# Patient Record
Sex: FEMALE | Race: WHITE
[De-identification: names, ages, dates, MRNs, and addresses within clinical notes are randomized per-mention and may not be internally consistent; named-entity substitution may affect disease eponyms.]

---

## 2017-03-24 ENCOUNTER — HOSPITAL ENCOUNTER (EMERGENCY)
Dept: HOSPITAL 68 - ERH | Age: 37
End: 2017-03-24
Payer: COMMERCIAL

## 2017-03-24 VITALS — HEIGHT: 67 IN | WEIGHT: 140 LBS | BODY MASS INDEX: 21.97 KG/M2

## 2017-03-24 VITALS — DIASTOLIC BLOOD PRESSURE: 76 MMHG | SYSTOLIC BLOOD PRESSURE: 119 MMHG

## 2017-03-24 DIAGNOSIS — B34.9: Primary | ICD-10-CM

## 2017-03-24 NOTE — ED INFLUENZA/URI COMPLAINT
History of Present Illness
 
General
Chief Complaint: General Adult
Stated Complaint: FEVER/ACHY
Source: patient, family, old records
Exam Limitations: no limitations
 
Vital Signs & Intake/Output
Vital Signs & Intake/Output
 Vital Signs
 
 
Date Time Temp Pulse Resp B/P Pulse O2 O2 Flow FiO2
 
     Ox Delivery Rate 
 
03/24 1841 98.7 92 18 119/76 98 Room Air  
 
03/24 1756 100.7       
 
03/24 1756 100.7       
 
03/24 1713 100.8       
 
03/24 1653 100.8 120 18 119/83 98 Room Air  
 
 
Allergies
Coded Allergies:
nitrofurantoin (From MACROBID) (NAUSEA 06/03/16)
prednisone (CHEST HURT 03/24/17)
sulfamethoxazole (From BACTRIM) (RASH, HIVES 03/24/17)
trimethoprim (From BACTRIM) (RASH, HIVES 03/24/17)
 
Reconcile Medications
Ferrous Sulfate (Slow Release Iron) (Unknown Strength) TABLET.ER   (Unknown Dose
) PO SI ANEMIA  (Reported)
Levothyroxine Sodium (Synthroid) 50 MCG TABLET   1 TAB PO DAILY THYROID  (
Reported)
Oseltamivir Phosphate (Tamiflu) 75 MG CAPSULE   1 CAP PO BID viral
 
Triage Note:
RECEIVED 35 YO FEMALE C/O CHILLS, FEVER
INTERMITTENTLY, INTERMITTENT MID LOWER BACK PAIN,
ACHY LEGS. DRY NON PRODUCTIVE COUGH. NO C/O N/V/D.
NO C/O ABDOMINAL PAIN OR SOB/CP.
Triage Nurses Notes Reviewed? yes
Onset: Abrupt
Duration: day(s): (1), constant
Timing: recent history
Severity: mild, moderate
Severity Numbers: 5
Prior Episodes/Possible Cause: occassional episodes
No Modifying Factors: none
Associated Symptoms: fever/chills, muscle aches, nasal congestion
Pregnant: No
Patient currently breastfeeds: No
HPI:
36-year-old female with history of hypothyroid presents complaining of a one-day
history of chills subjective fevers generalized bodyaches lower back pain and 
nonproductive cough all since this morning.  The patient states that [facility] sick contacts at home similar symptoms.  She denies 
shortness of breath wheezing no history of asthma no chest pain abdominal pain 
nausea vomiting or diarrhea.  The patient denies any urinary symptoms rashes to 
her skin.  She is an active smoker 6 cigarettes a day.  She is not taken 
anything for her symptoms today.  There are no modifying factors or associated 
symptoms otherwise.
 
 
(MARIMAR SANDOVAL)
 
Past History
 
Travel History
Traveled to [place] past 21 day No
 
Medical History
Any Pertinent Medical History? see below for history
Neurological: NONE
EENT: NONE
Cardiovascular: HOSPITALIZED FOR POST PARTUM PREECLAMPSIA
Respiratory: NONE
Gastrointestinal: NONE
Hepatic: NONE
Renal: nephrolithiasis
Musculoskeletal: NONE
Psychiatric: NONE
Endocrine: hypothyroidism
Blood Disorders: NONE
Cancer(s): NONE
GYN/Reproductive: NONE
 
Surgical History
Surgical History: non-contributory
 
Psychosocial History
What is your primary language English
Tobacco Use: Current Daily Use
Daily Tobacco Use Amount/Type: => 5 Cigarettes daily
 
Family History
Hx Contributory? No
(MARIMAR SANDOVAL)
 
Review of Systems
 
Review of Systems
Constitutional:
Reports: see HPI. 
All Other Systems: Reviewed and Negative
Comments
Review of systems: See HPI, All other systems negative.
Constitutional, no chills  fever, no malaise 
HEENT: No visual changes no sore throat ncongestion
Cardiovascular: No chest pain , no palpitation
Skin, no jaundice no rashes, no change in skin
Respiratory: No dyspnea no cough no  sputum no  hemoptysis
GI: No nausea no vomiting, no diarrhea
: No dysuria 
Muscle skeletal: No joint pain, no back pain, no neck pain,
Neurologic: No numbness , no headache
Psych: No stress
Heme/endocrine: No bruising no bleeding
Immunology: No lymphadenopathy
(MARIMAR SANDOVAL)
 
Physical Exam
 
Physical Exam
General Appearance: well developed/nourished, alert, awake
Ears, Nose, Throat: normal ENT inspection, moist mucous membrane, hearing 
grossly normal, Tympanic normal, pharynx normal
Comments:
Well-developed well-nourished patient in no apparent distress.
Head/Face: Atraumatic, no maxillary/frontal sinus tenderness, no facial swelling
Eyes: PERRL, EOMI, no conjunctival injection. No nystagmus
Ear:External auditory canal and Tympanic membranes clear, no erythema, no FB.
Nose: atraumatic.Normal inspection: No bleeding, no septal hematoma  
Throat: Moist mucous membranes.Pharynx normal.  No pharyngeal erythema/exudate 
seen. No stridor/drooling or assymetry. No swelling or edema. 
Neck: Supple, no lymphadenopathy, FROM
Back: FROM, Nontender
Cardiovascular: Regular rate and rhythms no murmurs rubs or gallops, 
Respiratory: Chest nontender.There were no bony deformities, no asymmetry. No 
respiratory distress.  Patient speaking in full complete sentences. Breath 
sounds clear to auscultation bilaterally: NO W/R/R
Extremities: full range of motion
Neuro: Alert and oriented x3
Skin: Warm & dry;No appreciable rash on exposed skin
Psych: Mood affect normal, normal memory normal judgment.
 
 
Core Measures
Severe Sepsis Present: No
Septic Shock Present: No
(MARIMAR SANDOVAL)
 
Progress
Differential Diagnosis: influenza, otitis, pneumonia, pharyngitis, sinusitis, 
bronchitis
Plan of Care:
 Orders
 
 
Procedure Date/time Status
 
URINALYSIS 03/24 1724 Complete
 
RAPID VIRAL INFLUENZA A 03/24 1713 Complete
 
 
 Laboratory Tests
 
 
 
03/24/17 1726:
Urinalysis LIGHT  H, Urine Color YEL, Urine Clarity CLEAR, Urine pH 7.0, Ur 
Specific Gravity 1.010, Urine Protein NEG, Urine Ketones NEG, Urine Nitrite NEG,
Urine Bilirubin NEG, Urine Urobilinogen 0.2, Ur Leukocyte Esterase MOD  H, Ur 
Microscopic SEDIMENT EXAMINED, Urine RBC 1-3, Urine WBC 1-3  H, Ur Epithelial 
Cells RARE, Urine Hemoglobin SMALL  H, Urine Glucose NEG
 Microbiology
03/24 1755  NASOPHARYN: Influenza Virus A & B Rapid Smear - COMP
 
Labs ordered old records reviewed patient was medicated with Tylenol in triage
 
 
3/24/2017 6:33:16 PM repeat temp 98.7
I discussed with the patient at length all of their results.  I had an extensive
conversation regarding need for close follow up with their primary care 
physician this week as well as return precautions.  I answered all of their 
questions, they feel comfortable with the plan and follow-up care. 
 
 
I discussed the medications that they will receive with the patient. I gave them
signs and symptoms that could indicate an adverse reaction. I have advised them 
to limit their activities until they can see how they respond to the medication.
 
 
(MARIMAR SANDOVAL)
Initial ED EKG: none
(MARIMAR SANDOVAL)
 
Departure
 
Departure
Time of Disposition: 1833
Disposition: HOME OR SELF CARE
Condition: Stable
Clinical Impression
Primary Impression: Viral syndrome
Referrals:
KEYANA PANIAGUA,RYLEE GONZALEZ (PCP/Family)
 
Additional Instructions:
Follow-up with your primary care physician on Monday Tylenol Motrin every 4-6 
hours drink plenty of fluids.  Tamiflu as discussed this was sent to [facility] 
pharmacy
Departure Forms:
Customer Survey
General Discharge Information
Prescriptions:
Current Visit Scripts
Oseltamivir Phosphate (Tamiflu) 1 CAP PO BID 
     #10 CAP 
 
 
(MARIMAR SANDOVAL)
 
PA/NP Co-Sign Statement
Statement:
ED Attending supervision documentation-
 
[] I saw and evaluated the patient. I have also reviewed all the pertinent lab 
results and diagnostic results. I agree with the findings and the plan of care 
as documented in the PA's/NP's documentation. 
 
[x] I have reviewed the ED Record and agree with the PA's/NP's documentation.
 
[] Additions or exceptions (if any) to the PAs/NP's note and plan are 
summarized below:
[]
 
(MAGALIE BUSTILLOS DO)

## 2017-05-03 ENCOUNTER — HOSPITAL ENCOUNTER (EMERGENCY)
Dept: HOSPITAL 68 - ERH | Age: 37
End: 2017-05-03
Payer: COMMERCIAL

## 2017-05-03 VITALS — WEIGHT: 135 LBS | HEIGHT: 67 IN | BODY MASS INDEX: 21.19 KG/M2

## 2017-05-03 VITALS — DIASTOLIC BLOOD PRESSURE: 84 MMHG | SYSTOLIC BLOOD PRESSURE: 132 MMHG
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
## 2017-05-03 DIAGNOSIS — S16.1XXA: Primary | ICD-10-CM
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
## 2017-05-03 DIAGNOSIS — S39.012A: ICD-10-CM
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
## 2018-08-29 ENCOUNTER — HOSPITAL ENCOUNTER (EMERGENCY)
Dept: HOSPITAL 68 - ERH | Age: 38
LOS: 1 days | End: 2018-08-30
Payer: COMMERCIAL

## 2018-08-29 DIAGNOSIS — R07.9: Primary | ICD-10-CM

## 2018-08-29 DIAGNOSIS — M79.602: ICD-10-CM

## 2018-08-29 DIAGNOSIS — F17.210: ICD-10-CM

## 2018-08-29 DIAGNOSIS — E03.9: ICD-10-CM

## 2018-08-29 LAB
ABSOLUTE GRANULOCYTE CT: 4.8 /CUMM (ref 1.4–6.5)
BASOPHILS # BLD: 0 /CUMM (ref 0–0.2)
BASOPHILS NFR BLD: 0.5 % (ref 0–2)
EOSINOPHIL # BLD: 0.2 /CUMM (ref 0–0.7)
EOSINOPHIL NFR BLD: 2.6 % (ref 0–5)
ERYTHROCYTE [DISTWIDTH] IN BLOOD BY AUTOMATED COUNT: 15.9 % (ref 11.5–14.5)
GRANULOCYTES NFR BLD: 59.7 % (ref 42.2–75.2)
HCT VFR BLD CALC: 34.6 % (ref 37–47)
LYMPHOCYTES # BLD: 2.2 /CUMM (ref 1.2–3.4)
MCH RBC QN AUTO: 25.2 PG (ref 27–31)
MCHC RBC AUTO-ENTMCNC: 32.7 G/DL (ref 33–37)
MCV RBC AUTO: 77.1 FL (ref 81–99)
MONOCYTES # BLD: 0.8 /CUMM (ref 0.1–0.6)
PLATELET # BLD: 384 /CUMM (ref 130–400)
PMV BLD AUTO: 7.6 FL (ref 7.4–10.4)
RED BLOOD CELL CT: 4.49 /CUMM (ref 4.2–5.4)
WBC # BLD AUTO: 8 /CUMM (ref 4.8–10.8)

## 2018-08-29 NOTE — ED CARDIAC/CP/PALPITATIONS
History of Present Illness
 
General
Chief Complaint: Chest Pain
Stated Complaint: CP
Source: patient
Exam Limitations: no limitations
 
Vital Signs & Intake/Output
Vital Signs & Intake/Output
 Vital Signs
 
 
Date Time Temp Pulse Resp B/P B/P Pulse O2 O2 Flow FiO2
 
     Mean Ox Delivery Rate 
 
 0023 98.2 60 20 119/70  99 Room Air  
 
 2322       Room Air  
 
 2124 98.4 102 17 137/87  100 Room Air  
 
 
 ED Intake and Output
 
 
  0000  1200
 
Intake Total  
 
Output Total  
 
Balance  
 
   
 
Patient 147 lb 
 
Weight  
 
Weight Reported by Patient 
 
Measurement  
 
Method  
 
 
 
Allergies
Coded Allergies:
nitrofurantoin (From MACROBID) (NAUSEA 16)
prednisone (CHEST HURT - TIGHTENING, HEART PALPITATIONS 10/19/17)
sulfamethoxazole (From BACTRIM) (RASH, HIVES 17)
trimethoprim (From BACTRIM) (RASH, HIVES 17)
 
Reconcile Medications
Levothyroxine Sodium (Synthroid) 50 MCG TABLET   1 TAB PO DAILY THYROID  (
Reported)
 
Triage Note:
PT TO ED WITH C/O MIDSTERNAL NON RADIATING CP
SINCE YESTERDAY. PAIN IS INTERMITTENT. ALSO NOW
HAS A "WARMTH" TO LEFT ARM. DENIES NAUSEA, SOB OR
DIZZINESS. +SMOKER. DENIES ORAL CONTRACEPTIVES OR
RECENT PROLONGED SITTING.
Triage Nurses Notes Reviewed? yes
Onset: Gradual
Duration: day(s):
Timing: recent history
Quality/Severity: moderate
Location: central
Pregnant: No
Patient currently breastfeeds: No
HPI:
37 yo woman, h/o hypothyroidism, presents with
3 days centeral chest pain.
 
"It was a 2/10 a few days ago... and now I don't have any pain... but my mom 
told me to come in."
 
She notes she had left arm pain a few days ago, but none presently.
 
She has no shortness of breath, chills, wheeze, cough.
She denies OCP's, prolonged trips.  
 
She is otherwise well. 
 
 
Past History
 
Travel History
Traveled to [place] past 21 day No
 
Medical History
Any Pertinent Medical History? see below for history
Neurological: NONE
EENT: NONE
Cardiovascular: HOSPITALIZED FOR POST PARTUM PREECLAMPSIA
Respiratory: NONE
Gastrointestinal: NONE
Hepatic: NONE
Renal: nephrolithiasis
Musculoskeletal: NONE
Psychiatric: NONE
Endocrine: hypothyroidism
Blood Disorders: NONE
Cancer(s): NONE
GYN/Reproductive: NONE
 
Surgical History
Surgical History: non-contributory
 
Psychosocial History
What is your primary language English
Tobacco Use: Current Daily Use
Daily Tobacco Use Amount/Type: => 5 Cigarettes daily
ETOH Use: occasional use
Illicit Drug Use: denies illicit drug use
 
Family History
Hx Contributory? No
 
Review of Systems
 
Review of Systems
Constitutional:
Reports: no symptoms. 
EENTM:
Reports: no symptoms. 
Respiratory:
Reports: no symptoms. 
Cardiovascular:
Reports: no symptoms. 
GI:
Reports: no symptoms. 
Genitourinary:
Reports: no symptoms. 
Musculoskeletal:
Reports: no symptoms. 
Skin:
Reports: no symptoms. 
Neurological/Psychological:
Reports: no symptoms. 
Hematologic/Endocrine:
Reports: no symptoms. 
Immunologic/Allergic:
Reports: no symptoms. 
All Other Systems: Reviewed and Negative
 
Physical Exam
 
Physical Exam
General Appearance: well developed/nourished, no apparent distress
Head: atraumatic, normal appearance
Eyes:
Bilateral: normal appearance. 
Ears, Nose, Throat: normal pharynx, normal ENT inspection
Neck: normal inspection, supple, full range of motion
Respiratory: normal breath sounds, no respiratory distress, quiet respiration, 
parasternal chest wall tenderness. 
Cardiovascular: regular rate/rhythm
Gastrointestinal: normal bowel sounds, soft
Back: normal inspection
Extremities: normal inspection, normal capillary refill, normal range of motion,
no edema
Neurologic/Psych: no motor/sensory deficits, awake, alert, oriented x 3
Skin: intact, normal color, warm/dry
 
Core Measures
ACS in differential dx? No
CVA/TIA Diagnosis No
Sepsis Present: No
Sepsis Focused Exam Completed? No
 
Progress
Differential Diagnosis: costochondritis vs acs vs MI vs other. 
Plan of Care:
 Orders
 
 
Procedure Date/time Status
 
TROPONIN LEVEL  0005 Complete
 
EKG  0005 Active
 
Add-on Test (ER Only) 2207 Active
 
THYROID STIMULATING HORMONE 2145 Complete
 
TROPONIN LEVEL 2125 Complete
 
LIPASE 2125 Complete
 
HEPATIC FUNCTION PANEL 2125 Complete
 
HUMAN BETA HCG SCREEN 2125 Complete
 
D-DIMER 2125 Complete
 
CBC WITHOUT DIFFERENTIAL 2125 Complete
 
BASIC METABOLIC PANEL 2125 Complete
 
AMYLASE 2125 Complete
 
EKG 2122 Active
 
 
 Laboratory Tests
 
 
 
18 0013:
Troponin I < 0.01
 
18:
Anion Gap 9, Estimated GFR > 60, BUN/Creatinine Ratio 17.5, Glucose 92, Calcium 
9.3, Total Bilirubin 0.3, Direct Bilirubin 0.1, AST 25, ALT 33, Alkaline 
Phosphatase 35, Troponin I < 0.01, Total Protein 7.0, Albumin 4.3, Amylase 39, 
Lipase 212, TSH 6.470  H, Total Beta HCG NEGATIVE, D-Dimer High Sensitivty < 200
, CBC w Diff NO MAN DIFF REQ, RBC 4.49, MCV 77.1  L, MCH 25.2  L, MCHC 32.7  L, 
RDW 15.9  H, MPV 7.6, Gran % 59.7, Lymphocytes % 27.6, Monocytes % 9.6  H, 
Eosinophils % 2.6, Basophils % 0.5, Absolute Granulocytes 4.8, Absolute 
Lymphocytes 2.2, Absolute Monocytes 0.8  H, Absolute Eosinophils 0.2, Absolute 
Basophils 0
 
Diagnostic Imaging:
Viewed by Me: Radiology Read.  Discussed w/RAD: Radiology Read. 
CXR Impression: PATIENT: TAN TOLENTINO  MEDICAL RECORD NO: [id number] PRESENT
AGE: 38  PATIENT ACCOUNT NO: [id number] : 81  LOCATION: [facility] ORDERING 
PHYSICIAN: David Llamas MD     SERVICE DATE:  EXAM TYPE: 
RAD - XRY-PORTABLE CHEST XRAY EXAMINATION: XR PORTABLE CHEST CLINICAL 
INFORMATION: Chest pain COMPARISON: None TECHNIQUE: Portable frontal view of the
chest was obtained. FINDINGS: No significant abnormality is noted involving the 
heart, lungs, mediastinum, bony thorax or soft tissues. IMPRESSION: Unremarkable
examination. DICTATED BY: Talat Brunner MD  DATE/TIME DICTATED:18 
:RAD.CHEN  DATE/TIME TRANSCRIBED:18 CONFIDENTIAL, 
DO NOT COPY WITHOUT APPROPRIATE AUTHORIZATION.  <Electronically signed in Other 
Vendor System>                                                                  
                     SIGNED BY: Talat Brunner MD 18 [id number]
Initial ED EKG: normal axis, normal intervals, normal p-waves, normal QRS 
complex, normal sinus rhythm
Repeat EKG: unchanged
 
Departure
 
Departure
Disposition: HOME OR SELF CARE
Condition: Stable
Clinical Impression
Primary Impression: Chest pain
Secondary Impressions: Hypothyroidism
Referrals:
Adair PANIAGUA,Phil GONZALEZ (PCP/Family)
 
Departure Forms:
Customer Survey
General Discharge Information
Comments
18, 1:23am... pt sleeping comfortably, no chest pain in the ED.  dimer neg,
trop neg x 2, ekg benign.... discussed at length... pt referred to cards... 
close follow up advised. 
 
Critical Care Note
 
Critical Care Note
Critical Care Time: non-applicable

## 2018-08-30 VITALS — SYSTOLIC BLOOD PRESSURE: 119 MMHG | DIASTOLIC BLOOD PRESSURE: 70 MMHG
